# Patient Record
Sex: FEMALE | Race: WHITE | NOT HISPANIC OR LATINO | Employment: FULL TIME | URBAN - METROPOLITAN AREA
[De-identification: names, ages, dates, MRNs, and addresses within clinical notes are randomized per-mention and may not be internally consistent; named-entity substitution may affect disease eponyms.]

---

## 2022-12-20 PROBLEM — Q52.8: Status: ACTIVE | Noted: 2022-12-20

## 2022-12-20 PROBLEM — K21.9 GERD (GASTROESOPHAGEAL REFLUX DISEASE): Status: ACTIVE | Noted: 2022-12-20

## 2022-12-20 PROBLEM — Q52.0: Status: ACTIVE | Noted: 2022-12-20

## 2023-09-11 DIAGNOSIS — Z12.31 SCREENING MAMMOGRAM, ENCOUNTER FOR: Primary | ICD-10-CM

## 2023-09-11 PROBLEM — R92.0 MICROCALCIFICATION OF RIGHT BREAST ON MAMMOGRAPHY: Status: ACTIVE | Noted: 2023-09-11

## 2023-09-11 PROBLEM — N64.4 BREAST PAIN: Status: ACTIVE | Noted: 2023-09-11

## 2023-09-11 NOTE — ASSESSMENT & PLAN NOTE
We reviewed our findings today and her questions were answered.  She understands that her imaging and exams have remained stable (and show nothing concerning). She has developed no new areas of suspicious calcifications bilaterally.  She knows that any new or changed calcifications in the future may necessitate the recommendation for tissue sampling. She is comfortable being followed in a conservative fashion.      She understands the importance of monthly self-breast examination and knows to report any and all changes as they occur.      NOTE:::We viewed her films together at today's visit.  We discussed the multiple views obtained and the important findings.  Even benign changes were mentioned and her questions were answered.  She knows that she may receive a formal letter or report from the Radiologist.  She is to contact us if she has questions.

## 2023-09-11 NOTE — PROGRESS NOTES
[facility]  Jason Dye MD, FACS  Gus Orosco NP-SALENA      Date of Service: 09/25/2023      Chief Complaint:   Ofelia Panda is a 48 y.o. female presenting today for her annual evaluation.  She is due for a mammogram. She reports no interval changes.     History of Present Illness:   Mrs. Panda first presented on October 24, 2017 after her right mammogram demonstrated an area of suspicious microcalcifications.  Stereotactic biopsy showed benign findings only.  She returned on December 22, 2021 with bilateral breast pain. She was treated conservatively with our [facility] protocol.  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   Review of patient's allergies indicates:   Allergen Reactions    Chlordiazepoxide-clidinium     Hydrocodone-acetaminophen       Social History     Tobacco Use    Smoking status: Never    Smokeless tobacco: Never   Substance Use Topics    Alcohol use: Not on file      History reviewed. No pertinent family history.     Review of Systems   Integumentary:  Negative for color change, rash, mole/lesion, breast mass, breast discharge and breast tenderness.   Breast: Negative for mass and tenderness       Physical Exam   HENT:   Head: Normocephalic.   Pulmonary/Chest: Right breast exhibits no inverted nipple, no mass, no nipple discharge, no skin change and no tenderness. Left breast exhibits no inverted nipple, no mass, no nipple discharge, no skin change and no tenderness. No breast swelling.   Genitourinary: No breast swelling.    Musculoskeletal: Lymphadenopathy:      Upper Body:      Right upper body: No supraclavicular or axillary adenopathy.      Left upper body: No supraclavicular or axillary adenopathy.     Neurological: She is alert.      MAMMOGRAM REPORT: She has some diffuse fibronodular tissue; there are no spiculated lesions, distortions or suspicious calcifications noted; NEM    ASSESSMENT and PLAN OF CARE     1. Screening mammogram, encounter for  Assessment & Plan:  Her films are stable based on my review.  As this was done as a screening exam, her films will be reviewed by the Radiologist and compared to her previous films.  Her report will usually be received within 24 hours.  Once received and reviewed - I will phone her with any additional recommendations as needed.        2. Microcalcification of right breast on mammography  Assessment & Plan:  We reviewed our findings today and her questions were answered.  She understands that her imaging and exams have remained stable (and show nothing concerning). She has developed no new areas of suspicious calcifications bilaterally.  She knows that any new or changed calcifications in the future may necessitate the recommendation for tissue sampling. She is comfortable being followed in a conservative fashion.      She understands the importance of monthly self-breast examination and knows to report any and all changes as they occur.      NOTE:::We viewed her films together at today's visit.  We discussed the multiple views obtained and the important findings.  Even benign changes were mentioned and her questions were answered.  She knows that she may receive a formal letter or report from the Radiologist.  She is to contact us if she has questions.        3. Breast pain  Assessment & Plan:  We discussed our fibrocystic mastopathy protocol in detail. She knows that if she follows this protocol - that her symptoms should improve.  We discussed how breast pain is usually not  associated with breast cancer, however, pain can be the presenting symptom with some cancers (but this could be coincidental). Still, if her pain does not improve in 8-12 weeks she should call us back for additional recommendations.        Medical Decision Making: It is my impression that this patient suffers all conditions contained in this medical document.  Each of these conditions did affect our plan of care and my medical decision making today.  It is my opinion that the medical decision making concerning this patient was of minimal  difficulty based on the aforementioned conditions.  Any further recommendations will be communicated to the patient by me.  I have reviewed and verified her allergies, list of medications, medical and surgical histories, social history, and a pertinent review of symptoms.     Follow up: 1 year and PRN    For: Physical Examination and MGM (S) at

## 2023-09-25 ENCOUNTER — OFFICE VISIT (OUTPATIENT)
Dept: SURGERY | Facility: CLINIC | Age: 48
End: 2023-09-25
Payer: COMMERCIAL

## 2023-09-25 DIAGNOSIS — R92.0 MICROCALCIFICATION OF RIGHT BREAST ON MAMMOGRAPHY: ICD-10-CM

## 2023-09-25 DIAGNOSIS — Z12.31 SCREENING MAMMOGRAM, ENCOUNTER FOR: Primary | ICD-10-CM

## 2023-09-25 DIAGNOSIS — N64.4 BREAST PAIN: ICD-10-CM

## 2023-09-25 PROCEDURE — 99213 OFFICE O/P EST LOW 20 MIN: CPT | Mod: S$GLB,,, | Performed by: SPECIALIST

## 2023-09-25 PROCEDURE — 99213 PR OFFICE/OUTPT VISIT, EST, LEVL III, 20-29 MIN: ICD-10-PCS | Mod: S$GLB,,, | Performed by: SPECIALIST

## 2024-09-05 DIAGNOSIS — Z12.31 SCREENING MAMMOGRAM, ENCOUNTER FOR: Primary | ICD-10-CM

## 2024-09-05 NOTE — PROGRESS NOTES
Date of Service: 9/25/2024      Chief Complaint:   Ofelia Panda is a 49 y.o. female presenting today for her annual evaluation.  She is due for a mammogram. She reports no interval changes.     History of Present Illness:   Mrs. Panda first presented on October 24, 2017 after her right mammogram demonstrated an area of suspicious microcalcifications.  Stereotactic biopsy showed benign findings only.  She returned on December 22, 2021 with bilateral breast pain. She was treated conservatively with our [facility] protocol.  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    Screening mammogram, encounter for 9/11/2023      Past Surgical History:   Procedure Laterality Date    AUGMENTATION OF BREAST      BREAST BIOPSY  2017    benign    DIAGNOSTIC LAPAROSCOPY          Current Outpatient Medications:     estradioL (DIVIGEL) 0.5 mg/0.5 gram (0.1 %) GlPk, Apply 1 packet to upper thigh once daily, Disp: 30 packet, Rfl: 11    fluticasone propionate (FLONASE) 50 mcg/actuation nasal spray, 1 spray by Each Nostril route 2 (two) times daily., Disp: , Rfl:     omeprazole (PRILOSEC) 20 MG capsule, Take 1 capsule by mouth every morning., Disp: , Rfl:    Review of patient's allergies indicates:   Allergen Reactions    Chlordiazepoxide-clidinium     Hydrocodone-acetaminophen       Social History     Tobacco Use    Smoking status: Never    Smokeless tobacco: Never   Substance Use Topics    Alcohol use: Not on file      No family history on file.     Review of Systems   Integumentary:  Negative for color change, rash, mole/lesion, thickening/swelling, dimpling of skin, drainage  Breast: Negative for mass and tenderness     Physical Exam   Constitutional: She appears well-developed. She is cooperative.   HENT: Normocephalic.   Cardiovascular:  Normal  rate and regular rhythm.            Pulmonary/Chest: She exhibits no tenderness and no bony tenderness.   Abdominal: Soft. Normal appearance.   Musculoskeletal: Intact with no deficits  Neurological: She is alert.   Skin: No rash noted.   Breast and Chest Wall Evaluation:   Right breast exhibits no mass, no nipple discharge, no skin change and no tenderness.     Left breast exhibits no mass, no nipple discharge, no skin change and no tenderness.     Lymphadenopathy: No supraclavicular or axillary adenopathy.    MAMMOGRAM REPORT: will obtain today    ASSESSMENT and PLAN OF CARE     1. Screening mammogram, encounter for  Assessment & Plan:  Her mammogram will be done as a screening exam and her films will be reviewed by the Radiologist and compared to her previous films.  Her report will usually be received within 24 hours.  Once received and reviewed - I will phone her with any additional recommendations as needed.        2. Microcalcification of right breast on mammography  Assessment & Plan:  We reviewed our findings today and her questions were answered.  She understands that her  exams have remained stable (and show nothing concerning). She has developed no new areas of suspicious calcifications bilaterally.  She knows that any new or changed calcifications in the future may necessitate the recommendation for tissue sampling. She is comfortable being followed in a conservative fashion.      She understands the importance of monthly self-breast examination and knows to report any and all changes as they occur.          3. Breast pain  Assessment & Plan:  We discussed our fibrocystic mastopathy protocol in detail. She knows that if she follows this protocol - that her symptoms should improve.  We discussed how breast pain is usually not associated with breast cancer, however, pain can be the presenting symptom with some cancers (but this could be coincidental). Still, if her pain does not improve in 8-12 weeks she  should call us back for additional recommendations.        Medical Decision Making: It is my impression that this patient suffers all conditions contained in this medical document.  Each of these conditions did affect our plan of care and my medical decision making today.  It is my opinion that the medical decision making concerning this patient was of minimal  difficulty based on the aforementioned conditions.  Any further recommendations will be communicated to the patient by me.  I have reviewed and verified her allergies, list of medications, medical and surgical histories, social history, and a pertinent review of symptoms.     Follow up: 1 year and PRN    For: Physical Examination and MGM (S) at

## 2024-09-06 NOTE — ASSESSMENT & PLAN NOTE
We reviewed our findings today and her questions were answered.  She understands that her  exams have remained stable (and show nothing concerning). She has developed no new areas of suspicious calcifications bilaterally.  She knows that any new or changed calcifications in the future may necessitate the recommendation for tissue sampling. She is comfortable being followed in a conservative fashion.      She understands the importance of monthly self-breast examination and knows to report any and all changes as they occur.

## 2024-09-06 NOTE — ASSESSMENT & PLAN NOTE
Her mammogram will be done as a screening exam and her films will be reviewed by the Radiologist and compared to her previous films.  Her report will usually be received within 24 hours.  Once received and reviewed - I will phone her with any additional recommendations as needed.

## 2024-09-25 ENCOUNTER — OFFICE VISIT (OUTPATIENT)
Dept: SURGERY | Facility: CLINIC | Age: 49
End: 2024-09-25
Payer: COMMERCIAL

## 2024-09-25 DIAGNOSIS — N64.4 BREAST PAIN: ICD-10-CM

## 2024-09-25 DIAGNOSIS — Z12.31 SCREENING MAMMOGRAM, ENCOUNTER FOR: Primary | ICD-10-CM

## 2024-09-25 DIAGNOSIS — R92.0 MICROCALCIFICATION OF RIGHT BREAST ON MAMMOGRAPHY: ICD-10-CM

## 2024-09-25 PROCEDURE — 99213 OFFICE O/P EST LOW 20 MIN: CPT | Mod: S$GLB,,, | Performed by: SPECIALIST

## 2024-09-25 PROCEDURE — 99999 PR PBB SHADOW E&M-EST. PATIENT-LVL II: CPT | Mod: PBBFAC,,, | Performed by: SPECIALIST

## 2025-04-22 ENCOUNTER — PATIENT MESSAGE (OUTPATIENT)
Dept: SURGERY | Facility: CLINIC | Age: 50
End: 2025-04-22
Payer: COMMERCIAL

## 2025-06-27 ENCOUNTER — PATIENT MESSAGE (OUTPATIENT)
Dept: SURGERY | Facility: CLINIC | Age: 50
End: 2025-06-27
Payer: COMMERCIAL